# Patient Record
Sex: MALE | Race: WHITE | URBAN - METROPOLITAN AREA
[De-identification: names, ages, dates, MRNs, and addresses within clinical notes are randomized per-mention and may not be internally consistent; named-entity substitution may affect disease eponyms.]

---

## 2017-05-24 ENCOUNTER — HISTORICAL (OUTPATIENT)
Dept: ADMINISTRATIVE | Facility: HOSPITAL | Age: 57
End: 2017-05-24

## 2017-05-24 LAB
ABS NEUT (OLG): 9.9 X10(3)/MCL
ALBUMIN SERPL-MCNC: 3.6 GM/DL (ref 3.4–5)
ALBUMIN/GLOB SERPL: 1 {RATIO}
ALP SERPL-CCNC: 105 UNIT/L (ref 45–117)
ALT SERPL-CCNC: 26 UNIT/L (ref 16–61)
AST SERPL-CCNC: 15 UNIT/L (ref 15–37)
BASOPHILS # BLD AUTO: 0.1 X10(3)/MCL
BASOPHILS NFR BLD AUTO: 0.5 % (ref 0–0.9)
BILIRUB SERPL-MCNC: 0.3 MG/DL (ref 0.2–1)
BILIRUBIN DIRECT+TOT PNL SERPL-MCNC: <0.1 MG/DL (ref 0–0.2)
BILIRUBIN DIRECT+TOT PNL SERPL-MCNC: >0.2 MG/DL (ref 0–1)
BUN SERPL-MCNC: 24 MG/DL (ref 7–18)
CALCIUM SERPL-MCNC: 9.1 MG/DL (ref 8.5–10.1)
CHLORIDE SERPL-SCNC: 104 MMOL/L (ref 98–107)
CO2 SERPL-SCNC: 27 MMOL/L (ref 21–32)
CREAT SERPL-MCNC: 1.15 MG/DL (ref 0.7–1.3)
EOSINOPHIL # BLD AUTO: 0.1 X10(3)/MCL
EOSINOPHIL NFR BLD AUTO: 0.6 % (ref 0–6.5)
ERYTHROCYTE [DISTWIDTH] IN BLOOD BY AUTOMATED COUNT: 13.6 % (ref 11.5–14.8)
GLOBULIN SER-MCNC: 5 GM/DL (ref 2–4)
GLUCOSE SERPL-MCNC: 152 MG/DL (ref 74–106)
HCT VFR BLD AUTO: 46.8 % (ref 36.2–49.3)
HGB BLD-MCNC: 14.9 GM/DL (ref 12.6–17.3)
LYMPHOCYTES # BLD AUTO: 1.4 X10(3)/MCL
LYMPHOCYTES NFR BLD AUTO: 11.4 % (ref 16.2–38.3)
MCH RBC QN AUTO: 29.9 PG (ref 28.5–33.8)
MCHC RBC AUTO-ENTMCNC: 31.8 % (ref 32.6–37)
MCV RBC AUTO: 94 FL (ref 80–99)
MONOCYTES # BLD AUTO: 1.1 X10(3)/MCL
MONOCYTES NFR BLD AUTO: 8.7 % (ref 4.7–11.3)
NEUTROPHILS # BLD AUTO: 9.9 X10(3)/MCL
NEUTROPHILS NFR BLD AUTO: 78.2 % (ref 49.1–73.4)
PLATELET # BLD AUTO: 282 X10(3)/MCL (ref 136–369)
PMV BLD AUTO: 11.6 FL (ref 7.4–10.4)
POTASSIUM SERPL-SCNC: 4.9 MMOL/L (ref 3.5–5.1)
PROT SERPL-MCNC: 8.4 GM/DL (ref 6.4–8.2)
RBC # BLD AUTO: 4.98 X10(6)/MCL (ref 4.19–5.75)
SODIUM SERPL-SCNC: 139 MMOL/L (ref 136–145)
WBC # SPEC AUTO: 12.7 X10(3)/MCL (ref 4–10.5)

## 2017-05-28 LAB — FINAL CULTURE: NORMAL

## 2017-05-30 LAB — FINAL CULTURE: NORMAL

## 2017-06-28 ENCOUNTER — HISTORICAL (OUTPATIENT)
Dept: ADMINISTRATIVE | Facility: HOSPITAL | Age: 57
End: 2017-06-28

## 2017-07-01 LAB — FINAL CULTURE: NORMAL

## 2017-10-25 ENCOUNTER — HISTORICAL (OUTPATIENT)
Dept: ADMINISTRATIVE | Facility: HOSPITAL | Age: 57
End: 2017-10-25

## 2017-10-28 LAB — FINAL CULTURE: NORMAL

## 2017-10-31 ENCOUNTER — HISTORICAL (OUTPATIENT)
Dept: ADMINISTRATIVE | Facility: HOSPITAL | Age: 57
End: 2017-10-31

## 2018-04-18 ENCOUNTER — HISTORICAL (OUTPATIENT)
Dept: ADMINISTRATIVE | Facility: HOSPITAL | Age: 58
End: 2018-04-18

## 2018-07-25 ENCOUNTER — HISTORICAL (OUTPATIENT)
Dept: ADMINISTRATIVE | Facility: HOSPITAL | Age: 58
End: 2018-07-25

## 2018-08-08 ENCOUNTER — HISTORICAL (OUTPATIENT)
Dept: ADMINISTRATIVE | Facility: HOSPITAL | Age: 58
End: 2018-08-08

## 2018-11-21 ENCOUNTER — HISTORICAL (OUTPATIENT)
Dept: ADMINISTRATIVE | Facility: HOSPITAL | Age: 58
End: 2018-11-21

## 2019-01-21 ENCOUNTER — HISTORICAL (OUTPATIENT)
Dept: ADMINISTRATIVE | Facility: HOSPITAL | Age: 59
End: 2019-01-21

## 2019-01-29 ENCOUNTER — HISTORICAL (OUTPATIENT)
Dept: ADMINISTRATIVE | Facility: HOSPITAL | Age: 59
End: 2019-01-29

## 2019-02-27 ENCOUNTER — HISTORICAL (OUTPATIENT)
Dept: ADMINISTRATIVE | Facility: HOSPITAL | Age: 59
End: 2019-02-27

## 2019-04-03 ENCOUNTER — HISTORICAL (OUTPATIENT)
Dept: ADMINISTRATIVE | Facility: HOSPITAL | Age: 59
End: 2019-04-03

## 2019-04-03 LAB
ABS NEUT (OLG): 8.35 X10(3)/MCL (ref 2.1–9.2)
ALBUMIN SERPL-MCNC: 3.6 GM/DL (ref 3.4–5)
ALBUMIN/GLOB SERPL: 0.9 RATIO (ref 1.1–2)
ALP SERPL-CCNC: 75 UNIT/L (ref 50–136)
ALT SERPL-CCNC: 29 UNIT/L (ref 12–78)
AST SERPL-CCNC: 13 UNIT/L (ref 15–37)
BASOPHILS # BLD AUTO: 0.1 X10(3)/MCL (ref 0–0.2)
BASOPHILS NFR BLD AUTO: 1 %
BILIRUB SERPL-MCNC: 0.3 MG/DL (ref 0.2–1)
BILIRUBIN DIRECT+TOT PNL SERPL-MCNC: 0.1 MG/DL (ref 0–0.5)
BILIRUBIN DIRECT+TOT PNL SERPL-MCNC: 0.2 MG/DL (ref 0–0.8)
BUN SERPL-MCNC: 28 MG/DL (ref 7–18)
CALCIUM SERPL-MCNC: 9.2 MG/DL (ref 8.5–10.1)
CHLORIDE SERPL-SCNC: 105 MMOL/L (ref 98–107)
CO2 SERPL-SCNC: 32 MMOL/L (ref 21–32)
CREAT SERPL-MCNC: 1.23 MG/DL (ref 0.7–1.3)
CRP SERPL HS-MCNC: 27.5 MG/L (ref 0–3)
EOSINOPHIL # BLD AUTO: 0.2 X10(3)/MCL (ref 0–0.9)
EOSINOPHIL NFR BLD AUTO: 2 %
ERYTHROCYTE [DISTWIDTH] IN BLOOD BY AUTOMATED COUNT: 15.8 % (ref 11.5–17)
ERYTHROCYTE [SEDIMENTATION RATE] IN BLOOD: 37 MM/HR (ref 0–15)
EST. AVERAGE GLUCOSE BLD GHB EST-MCNC: 177 MG/DL
GLOBULIN SER-MCNC: 3.8 GM/DL (ref 2.4–3.5)
GLUCOSE SERPL-MCNC: 146 MG/DL (ref 74–106)
HBA1C MFR BLD: 7.8 % (ref 4.2–6.3)
HCT VFR BLD AUTO: 45.8 % (ref 42–52)
HGB BLD-MCNC: 13.1 GM/DL (ref 14–18)
LYMPHOCYTES # BLD AUTO: 2.6 X10(3)/MCL (ref 0.6–4.6)
LYMPHOCYTES NFR BLD AUTO: 20 %
MCH RBC QN AUTO: 26.8 PG (ref 27–31)
MCHC RBC AUTO-ENTMCNC: 28.6 GM/DL (ref 33–36)
MCV RBC AUTO: 93.9 FL (ref 80–94)
MONOCYTES # BLD AUTO: 1.1 X10(3)/MCL (ref 0.1–1.3)
MONOCYTES NFR BLD AUTO: 9 %
NEUTROPHILS # BLD AUTO: 8.35 X10(3)/MCL (ref 2.1–9.2)
NEUTROPHILS NFR BLD AUTO: 66 %
PLATELET # BLD AUTO: 312 X10(3)/MCL (ref 130–400)
PMV BLD AUTO: 11 FL (ref 9.4–12.4)
POTASSIUM SERPL-SCNC: 5.2 MMOL/L (ref 3.5–5.1)
PREALB SERPL-MCNC: 31 MG/DL (ref 18–38)
PROT SERPL-MCNC: 7.4 GM/DL (ref 6.4–8.2)
RBC # BLD AUTO: 4.88 X10(6)/MCL (ref 4.7–6.1)
SODIUM SERPL-SCNC: 139 MMOL/L (ref 136–145)
WBC # SPEC AUTO: 12.7 X10(3)/MCL (ref 4.5–11.5)

## 2019-05-17 ENCOUNTER — HISTORICAL (OUTPATIENT)
Dept: ADMINISTRATIVE | Facility: HOSPITAL | Age: 59
End: 2019-05-17

## 2019-05-20 LAB — FINAL CULTURE: NORMAL

## 2022-09-13 NOTE — HISTORICAL [facility]
This is a historical note converted from Ally. Formatting and pictures may have been removed.  Please reference Ally for original formatting and attached multimedia. Chief Complaint  RIGHT HIP PAIN AND RIGHT KNEE REFERRED FROM DR. CARRILLO.  History of Present Illness  58-year-old male presents today for evaluation of right hip pain. ?Patient has had right groin pain with significant stiffness for a number of years. ?He is tried anti-inflammatories of although is unable to take these due to?being on blood thinners. ?He has difficulty ambulating due to his hip and groin pain. ?Notes significant stiffness.  Review of Systems  Denies fevers, chills, chest pain, shortness of breath. Comprehensive review of systems performed and otherwise negative except as noted in HPI.  Physical Exam  Vitals & Measurements  BP:?142/70?  HT:?144.7?cm? WT:?50.07?kg? BMI:?23.91?  General: No acute distress, alert and oriented, healthy appearing?  HEENT: Head is atraumatic, mucous membranes are moist  Neck: Supples, no JVD  Cardiovascular: Palpable dorsalis pedis and posterior tibial pulses, regular rate and rhythm to those pulses  Lungs: Breathing non-labored  Skin: no rashes appreciated  Neurologic: Can flex and extend knees, ankles, and toes. Sensation is grossly intact  ?  Right hip without wound or skin breakdown.?  Mild?tenderness to palpation about the greater trochanter and gluteus  Flexion to 85. Internal Rotation to -10. External Rotation to 10. ?Significant groin pain with range of motion  Positive?Stinchfield  Negative Trendelenburg Sign  Positive antalgic gait  Negative?straight leg raise  Assessment/Plan  1.?Primary osteoarthritis of right hip?M16.11  ?Patient with end-stage arthritis of the right hip with complete loss of joint space. ?We discussed all treatment options. ?He is currently?at a BMI of roughly 47. ?We discussed weight loss options as well. ?He also has an open sore to his left foot?which we need to get healed  prior to surgical intervention.? He like to try an injection to try to calm his pain down. ?We will plan for injection of his left hip?on February 27.? We will see him back in 2 weeks for preop.  Ordered:  Clinic Follow up, *Est. 02/12/19 3:00:00 CST, Order for future visit[facility]  Clinic Follow-up Pre Op, 01/29/19 15:18:00 CST, Order for future visit, Primary osteoarthritis of right hip, Orthopaedics  Office/Outpatient Visit Level 3 Established [id number] PC, [facility], 01/29/19 15:17:00 CST  Office/Outpatient Visit Level 3 Established [id number] PC, [facility], 01/29/19 14:50:00 CST  ?  Right hip pain?M25.551  ?   Problem List/Past Medical History  Ongoing  Anxiety and depression  DM foot ulcer  DMII (diabetes mellitus, type 2)  HTN (hypertension)  Knee osteoarthritis  KAREN on CPAP  Peripheral edema  Peripheral neuropathy  Historical  No qualifying data  Procedure/Surgical History  right eye   Medications  allopurinol 300 mg oral tablet, See Instructions, 3 refills  amLODIPine 10 mg oral tablet, See Instructions, 3 refills  CLOPIDOGREL 75 MG TABLET, 75 mg= 1 tab(s), Oral, Daily  FARXIGA 10 MG TABLET, 10 mg= 1 tab(s), Oral, Daily  FUROSEMIDE 20 MG TABLET, 20 mg= 1 tab(s), Oral, Daily  jed oral capsule, See Instructions  GLIPIZIDE-METFORMIN 5-500 MG, 2 tab(s), Oral, BID  JANUVIA 50 MG TABLET, 50 mg= 1 tab(s), Oral, Daily  Percocet 5/325 oral tablet, 1 tab(s), Oral, q6hr, PRN  sertraline 50 mg oral tablet, 50 mg= 1 tab(s), Oral, Daily, 3 refills  TRESIBA FLEXTOUCH 200 UNITS/ML, See Instructions  Vitamin D 50,000 intl units oral capsule, 35682 IntUnit= 1 cap(s), Oral, qWeek, 3 refills  Allergies  No Known Medication Allergies  Social History  Alcohol  Past, 05/24/2017  Employment/School  Unemployed, 05/15/2018  Substance Abuse  Never, 05/24/2017  Tobacco  Former smoker, quit more  than 30 days ago, No, 01/29/2019  Former smoker, quit more than 30 days ago, No, 01/21/2019  Former smoker, quit more than 30 days ago, N/A, 01/09/2019  Former smoker, 05/24/2017  Family History  CAD - Coronary artery disease: Father.  CVA - Cerebrovascular accident: Father.  Diabetes mellitus type 2: Mother, Father and Brother.  Immunizations  Vaccine Date Status   tetanus/diphtheria/pertussis, acel(Tdap) 12/02/2015 Recorded   Health Maintenance  Health Maintenance  ???Pending?(in the next year)  ??? ??OverDue  ??? ? ? ?Coronary Artery Disease Maintenance-Antiplatelet Agent Prescribed due??and every?  ??? ??Due?  ??? ? ? ?ADL Screening due??01/29/19??and every 1??year(s)  ??? ? ? ?Alcohol Misuse Screening due??01/29/19??and every 1??year(s)  ??? ? ? ?Aspirin Therapy for CVD Prevention due??01/29/19??and every 1??year(s)  ??? ? ? ?Colorectal Screening due??01/29/19??and every?  ??? ? ? ?Coronary Artery Disease Maintenance-Lipid Lowering Therapy due??01/29/19??and every?  ??? ? ? ?Diabetes Maintenance-Urine Dipstick due??01/29/19??Variable frequency  ??? ? ? ?Diabetes Maintenance-Microalbumin due??01/29/19??Variable frequency  ??? ? ? ?Diabetes Maintenance-Eye Exam due??01/29/19??and every?  ??? ? ? ?Diabetes Maintenance-Foot Exam due??01/29/19??and every?  ??? ? ? ?Diabetes Maintenance-Fasting Lipid Profile due??01/29/19??and every?  ??? ? ? ?Hypertension Management-Education due??01/29/19??and every 1??year(s)  ??? ? ? ?Lipid Screening due??01/29/19??and every?  ??? ? ? ?Smoking Cessation due??01/29/19??Variable frequency  ??? ? ? ?Zoster Vaccine due??01/29/19??and every 100??year(s)  ??? ??Due In Future?  ??? ? ? ?Hypertension Management-BMP not due until??12/12/19??and every 1??year(s)  ??? ? ? ?Diabetes Maintenance-Serum Creatinine not due until??12/12/19??and every 1??year(s)  ??? ? ? ?Blood Pressure Screening not due until??01/21/20??and every 1??year(s)  ??? ? ? ?Body Mass Index Check not due  until??01/21/20??and every 1??year(s)  ??? ? ? ?Diabetes Maintenance-HgbA1c not due until??01/21/20??and every 1??year(s)  ??? ? ? ?Hypertension Management-Blood Pressure not due until??01/21/20??and every 1??year(s)  ???Satisfied?(in the past 1 year)  ??? ??Satisfied?  ??? ? ? ?Blood Pressure Screening on??01/29/19.??Satisfied by Maribel Smiley  ??? ? ? ?Body Mass Index Check on??01/29/19.??Satisfied by Maribel Smiley  ??? ? ? ?Coronary Artery Disease Maintenance-Antiplatelet Agent Prescribed on??01/09/19.  ??? ? ? ?Depression Screening on??01/21/19.??Satisfied by Rosangela Larson  ??? ? ? ?Diabetes Maintenance-Serum Creatinine on??12/12/18.??Satisfied by Amy Roche  ??? ? ? ?Diabetes Screening on??12/12/18.??Satisfied by Amy Roche  ??? ? ? ?Hypertension Management-Blood Pressure on??01/29/19.??Satisfied by Maribel Smiley  ??? ? ? ?Influenza Vaccine on??01/29/19.??Satisfied by Maribel Smiley  ??? ? ? ?Obesity Screening on??01/29/19.??Satisfied by Maribel Smiley  ?  ?  Diagnostic Results  AP lateral right hip taken reviewed. ?Patient with end-stage?arthritis with complete loss of joint space and bone-on-bone articulation.

## 2022-09-13 NOTE — HISTORICAL [facility]
This is a historical note converted from Ally. Formatting and pictures may have been removed.  Please reference Ally for original formatting and attached multimedia. Chief Complaint  Right knee pain ongoing for years. Pt states the pain is mainly in the knee no radiating pain. Pt states he had a knee injection on 1/16/19 wit his pcp Dr Bear. Denies any injury or fall  History of Present Illness  He is a pleasant 4-year-old whose had?right knee pain and right hip pain for the last few years.? The pain is localized globally around the knee and also around the hip.??He is using a cane for ambulation. ?He has had history of injections in the past without significant relief.? Last injection was in January 2019.? He is using pain medicine intermittently. ?He cannot take anti-inflammatory medicines because of his Plavix use.? He is got a history of diabetes and has a foot ulcer?on the contralateral side. ?He also has a?vascular procedure to reestablish a good blood flow.  Review of Systems  Comprehensive review of system?was performed with no exceptions other than noted in the history of present illness  Physical Exam  Vitals & Measurements  BP:?142/70?  HT:?170?cm? HT:?170?cm? HT:?170?cm? WT:?144.7?kg? WT:?144.7?kg? WT:?144.7?kg? BMI:?50.07?  Gen: WN, WD, NAD  Card/Res: NL breathing, +distal pulses  Abdomen: ND  Standing exam  stance:?Varus alignment, no significant leg-length discrepancy  gait:?Antalgic limp  ?   Knee examination  - General comments: Venous stasis changes  ?   - Tenderness: Global  ?   Knee??????????RIGHT??????????LEFT  Skin: ??????????Intact ??????????Intact  ROM:??????????0-110??????????0-130  Effusion:??????+????????????? Neg  MJL TTP:??????+????????????? Neg  LJL TTP:????????+????????????? Neg  Reginaldo:??? +????????????? Neg  Pat crep:???????+????????????????Neg  Patella TTPs: Neg ???????????????Neg  Patella grind: Neg??????????? ?Neg  Lachman: ?????Neg ????????????????????Neg  Pivot shift:  ?????Neg ???????????? Neg  Valgus stress: Neg ???????????????Neg  Varus stress: Neg ???????????????Neg  Posterior drawer: Neg ??????????Neg  ?   N-V ????????????????????intact??????????intact  Hip:???????????????????????Limited range of motion to 70 degrees of forward flexion and?5 degrees of internal and external rotation.  ?   Lower extremity edema:Negative  ?  Assessment/Plan  1.?OA (osteoarthritis) of hip?M16.9  I think?he would be best suited with a total hip arthroplasty. ?We will get him over to see 1 of my partners for?surgical consultation.  Ordered:  Office/Outpatient Visit Level 3 New [id number] PC, OA (osteoarthritis) of hip  OA (osteoarthritis) of knee, [facility], 01/21/19 15:42:00 CST  ?  2.?OA (osteoarthritis) of knee?M17.10  Ordered:  Office/Outpatient Visit Level 3 New [id number] PC, OA (osteoarthritis) of hip  OA (osteoarthritis) of knee, [facility], 01/21/19 15:42:00 CST  ?  Orders:  Clinic Follow-up PRN, 01/21/19 15:42:00 CST, Future Order, [facility]  XR Knee Right 4 or More Views, Routine, 01/21/19 15:04:00 CST, Pain, right knee pain, None, Ambulatory, Rad Type, Right knee pain, Schedule this test, Not Scheduled, 01/21/19 15:04:00 CST   Problem List/Past Medical History  Ongoing  Anxiety and depression  DM foot ulcer  DMII (diabetes mellitus, type 2)  HTN (hypertension)  Knee osteoarthritis  KAREN on CPAP  Peripheral edema  Peripheral neuropathy  Historical  No qualifying data  Procedure/Surgical History  right eye   Medications  allopurinol 300 mg oral tablet, See Instructions, 3 refills  amLODIPine 10 mg oral tablet, See Instructions, 3 refills  CLOPIDOGREL 75 MG TABLET, 75 mg= 1 tab(s), Oral, Daily  FARXIGA 10 MG TABLET, 10 mg= 1 tab(s), Oral, Daily  FUROSEMIDE 20 MG TABLET, 20 mg= 1 tab(s), Oral, Daily  jed oral capsule, See Instructions  GLIPIZIDE-METFORMIN 5-500 MG, 2 tab(s), Oral, BID  JANUVIA 50 MG TABLET, 50 mg= 1 tab(s), Oral, Daily  Percocet 5/325 oral tablet, 1 tab(s), Oral,  q6hr, PRN  sertraline 50 mg oral tablet, 50 mg= 1 tab(s), Oral, Daily, 3 refills  TRESIBA FLEXTOUCH 200 UNITS/ML, See Instructions  Vitamin D 50,000 intl units oral capsule, 27510 IntUnit= 1 cap(s), Oral, qWeek, 3 refills  Allergies  No Known Medication Allergies  Social History  Alcohol  Past, 05/24/2017  Employment/School  Unemployed, 05/15/2018  Substance Abuse  Never, 05/24/2017  Tobacco  Former smoker, quit more than 30 days ago, No, 01/21/2019  Former smoker, quit more than 30 days ago, N/A, 01/09/2019  Former smoker, 05/24/2017  Family History  CAD - Coronary artery disease: Father.  CVA - Cerebrovascular accident: Father.  Diabetes mellitus type 2: Mother, Father and Brother.  Immunizations  Vaccine Date Status   tetanus/diphtheria/pertussis, acel(Tdap) 12/02/2015 Recorded   Health Maintenance  Health Maintenance  ???Pending?(in the next year)  ??? ??OverDue  ??? ? ? ?Coronary Artery Disease Maintenance-Antiplatelet Agent Prescribed due??and every?  ??? ??Due?  ??? ? ? ?ADL Screening due??01/21/19??and every 1??year(s)  ??? ? ? ?Alcohol Misuse Screening due??01/21/19??and every 1??year(s)  ??? ? ? ?Aspirin Therapy for CVD Prevention due??01/21/19??and every 1??year(s)  ??? ? ? ?Colorectal Screening due??01/21/19??and every?  ??? ? ? ?Coronary Artery Disease Maintenance-Lipid Lowering Therapy due??01/21/19??and every?  ??? ? ? ?Diabetes Maintenance-Urine Dipstick due??01/21/19??Variable frequency  ??? ? ? ?Diabetes Maintenance-Microalbumin due??01/21/19??Variable frequency  ??? ? ? ?Diabetes Maintenance-Eye Exam due??01/21/19??and every?  ??? ? ? ?Diabetes Maintenance-Foot Exam due??01/21/19??and every?  ??? ? ? ?Diabetes Maintenance-Fasting Lipid Profile due??01/21/19??and every?  ??? ? ? ?Hypertension Management-Education due??01/21/19??and every 1??year(s)  ??? ? ? ?Lipid Screening due??01/21/19??and every?  ??? ? ? ?Smoking Cessation due??01/21/19??Variable frequency  ??? ? ? ?Zoster Vaccine  due??01/21/19??and every 100??year(s)  ??? ??Due In Future?  ??? ? ? ?Diabetes Maintenance-HgbA1c not due until??11/09/19??and every 1??year(s)  ??? ? ? ?Hypertension Management-BMP not due until??12/12/19??and every 1??year(s)  ??? ? ? ?Diabetes Maintenance-Serum Creatinine not due until??12/12/19??and every 1??year(s)  ??? ? ? ?Blood Pressure Screening not due until??01/09/20??and every 1??year(s)  ??? ? ? ?Body Mass Index Check not due until??01/09/20??and every 1??year(s)  ??? ? ? ?Hypertension Management-Blood Pressure not due until??01/09/20??and every 1??year(s)  ???Satisfied?(in the past 1 year)  ??? ??Satisfied?  ??? ? ? ?Blood Pressure Screening on??01/21/19.??Satisfied by Rosangela Larson  ??? ? ? ?Body Mass Index Check on??01/21/19.??Satisfied by Rosangela Larson  ??? ? ? ?Coronary Artery Disease Maintenance-Antiplatelet Agent Prescribed on??01/09/19.  ??? ? ? ?Depression Screening on??01/21/19.??Satisfied by Rosangela Larson  ??? ? ? ?Diabetes Maintenance-Serum Creatinine on??12/12/18.??Satisfied by Amy Roche  ??? ? ? ?Diabetes Screening on??12/12/18.??Satisfied by Amy Roche  ??? ? ? ?Hypertension Management-Blood Pressure on??01/21/19.??Satisfied by Rosangela Larson  ??? ? ? ?Influenza Vaccine on??01/09/19.??Satisfied by Jaylene Yates LPN  ??? ? ? ?Obesity Screening on??01/21/19.??Satisfied by Rosangela Larson  ?  ?  Diagnostic Results  Knee radiographs 1/21/2019:?4 views of bilateral knees show?advanced disease in the medial and?patellofemoral compartments

## 2022-09-13 NOTE — OP NOTE
Patient:   Braxton Elizabeth             MRN: [id number]            FIN: [id number]               Age:   58 years     Sex:  Male     :  1960   Associated Diagnoses:   Primary osteoarthritis of right hip   Author:   Rivera Frazier MD      Operative Note   Operative Information   Date/ Time:  10/15/2018 07:38:00.     Procedures Performed: Right hip injection using fluoroscopy.     Preoperative Diagnosis: Primary osteoarthritis of right hip ([id number] M16.11).     Postoperative Diagnosis: Primary osteoarthritis of right hip ([id number] M16.11).     Surgeon: Rivera Frazier MD.     Anesthesia: concious sedation.     Description of Procedure/Findings/    Complications: Patient was involved who complains of ongoing right hip pain.  We discussed all treatment options.  Patient has a history of arthritis to the right hip.  Patient has tried and failed all measures.  The risk, benefits, alternatives to injection of right hip under anesthesia were discussed in detail including but limited to infection, bleeding, scarring, need for revision surgery, and resolution of pain.  Despite these risks patient elected to proceed with surgical mention.    Patient seen in preoperative holding.  The risk benefits alternatives again discussed.  All questions answered no guarantees made.  Patient was marked and consented.  Was taken the operating room.  placed under conscious sedation.  Using fluoroscopic guidance, the insertion site was localized.  It was injected with 3-4 cc 1% lidocaine.  Afterwards an 18-gauge spinal needle was then inserted using fluoroscopic guidance to the superior anterior aspect of the right hip/acetabulum.  2-3 cc of Isovue contrast was then injected.  We had an excellent arthrogram.  Afterwards 2 cc 1% lidocaine and 12 mg of betamethasone was injected into the right hip joint.  Needle was removed.  Band-Aid was placed over the injection site.  Patient arose from anesthesia without issue.  Was  transferred to recovery room in stable condition. postop he will be weightbearing as tolerated.  Patient will be discharged home.  .     Esimated blood loss: loss  0.5  cc.

## 2022-09-13 NOTE — ED PROVIDER NOTES
Patient:   Braxton Elizabeth             MRN: [id number]            FIN: [id number]               Age:   56 years     Sex:  Male     :  1960   Associated Diagnoses:   Venous stasis ulcer of right lower extremity   Author:   Flavio Gonsales NP      Basic Information   Time seen: Date & time 2017 15:00:00.   History source: Patient.   Arrival mode: Private vehicle.   History limitation: None.   Additional information: Chief Complaint from Nursing Triage Note : Chief Complaint   2017 14:42 CDT      Chief Complaint           right foot non-healing wound - states he is on 2nd round of antibiotics and now noticed foul smelling drainage.  .      History of Present Illness   The patient presents with right lateral plantar stasis ulcer and presently on Bactrim and Bactroban and has appt with podiatrist in 9 days. (next Friday), Patient has no fever..  The onset was chronic.  The course/duration of symptoms is fluctuating in intensity.  Type of injury: none.  Location: Right plantar surface foot. The character of symptoms is pain and redness.  The degree at present is minimal.  There are exacerbating factors including weight bearing, walking and palpation.  The relieving factor is none.  Risk factors consist of diabetes mellitus.  Prior episodes: none.  Therapy today: none.  Associated symptoms: none.  Additional history: none.        Review of Systems   Constitutional symptoms:  Negative except as documented in HPI.   Skin symptoms:  Breakdown, right plantar stasis ulcer.    Eye symptoms:  Negative except as documented in HPI.   ENMT symptoms:  Negative except as documented in HPI.   Respiratory symptoms:  Negative except as documented in HPI.   Cardiovascular symptoms:  Negative except as documented in HPI.   Gastrointestinal symptoms:  Negative except as documented in HPI.   Genitourinary symptoms:  Negative except as documented in HPI.   Musculoskeletal symptoms:  Negative except as documented in HPI.    Neurologic symptoms:  Negative except as documented in HPI.   Psychiatric symptoms:  Negative except as documented in HPI.   Endocrine symptoms:  Negative except as documented in HPI.   Hematologic/Lymphatic symptoms:  Negative except as documented in HPI.   Allergy/immunologic symptoms:  Negative except as documented in HPI.             Additional review of systems information: All other systems reviewed and otherwise negative.      Health Status   Allergies:    Allergies (1) Active Reaction  No Known Medication Allergies None Documented  , no known allergies.   Medications:  (Selected)   Documented Medications  Documented  Bactrim  mg-160 mg oral tablet: 1 tab(s), Oral, BID, # 20 tab(s), 0 Refill(s)  mupirocin 2% topical cream: 1 fiona, TOP, TID, # 30 gm, 0 Refill(s), per nurse's notes.      Past Medical/ Family/ Social History   Medical history: Negative.   Surgical history: Negative.   Family history: Not significant.   Social history: Alcohol use: Denies, Tobacco use: Denies, Drug use: Denies.      Physical Examination               Vital Signs   Vital Signs   5/24/2017 14:42 CDT      Temperature Oral          37.3 DegC                             Peripheral Pulse Rate     85 bpm                             Respiratory Rate          16 br/min                             SpO2                      94 %                             Oxygen Therapy            Room air                             Systolic Blood Pressure   166 mmHg  HI                             Diastolic Blood Pressure  82 mmHg  .   Measurements   5/24/2017 14:42 CDT      Weight Dosing             140 kg                             Weight Measured and Calculated in Lbs     308.64 lb                             Weight Estimated          140 kg                             Height/Length Dosing      173 cm                             Height/Length Estimated   173 cm                             Body Mass Index Estimated 46.78 kg/m2  .   Basic Oxygen  Information   5/24/2017 14:42 CDT      SpO2                      94 %                             Oxygen Therapy            Room air  .   General:  Alert, no acute distress.    Skin:  Warm, dry, intact.    Head:  Normocephalic, atraumatic.    Neck:  Supple, trachea midline, no tenderness.    Eye:  Pupils are equal, round and reactive to light.   Cardiovascular:  Regular rate and rhythm, No murmur, Arterial pulses: Right, lower extremity, femoral, popliteal, posterior tibial, dorsalis pedis, 2+.    Respiratory:  Lungs are clear to auscultation, respirations are non-labored, breath sounds are equal.    Chest wall:  No tenderness.   Musculoskeletal:  Normal ROM, normal strength, Ankle/foot: Right, lateral, plantar, foot, aligned, tenderness, range of motion normal, quarter sized healing stage 1 stasis ulcer with minimal erythema, no drainage and no foul smell noted., no swelling, no abrasion, no erythema, no ecchymosis.    Gastrointestinal:  Soft, Nontender, Normal bowel sounds.    Genitourinary:  No tenderness.   Neurological:  Alert and oriented to person, place, time, and situation, normal speech observed.    Psychiatric:  Cooperative, appropriate mood & affect.       Medical Decision Making   Differential Diagnosis:  Stasis foot ulcer  .   Documents reviewed:  Emergency department nurses' notes, emergency department records.    Results review:  Lab results : Lab View   5/24/2017 15:17 CDT      Sodium Lvl                139 mmol/L                             Potassium Lvl             4.9 mmol/L                             Chloride                  104 mmol/L                             CO2                       27.0 mmol/L                             Calcium Lvl               9.1 mg/dL                             Glucose Lvl               152 mg/dL  HI                             BUN                       24.0 mg/dL  HI                             Creatinine                1.15 mg/dL                              eGFR-AA                   >60 mL/min/1.73 m2  NA                             eGFR-DYLON                  >60 mL/min/1.73 m2  NA                             Bili Total                0.3 mg/dL                             Bili Direct               <0.10 mg/dL                             Bili Indirect             >0.20 mg/dL                             AST                       15 unit/L                             ALT                       26 unit/L                             Alk Phos                  105 unit/L                             Total Protein             8.4 gm/dL  HI                             Albumin Lvl               3.6 gm/dL                             Globulin                  5 gm/dL  HI                             A/G Ratio                 1  NA                             WBC                       12.7 x10(3)/mcL  HI                             RBC                       4.98 x10(6)/mcL                             Hgb                       14.9 gm/dL                             Hct                       46.8 %                             Platelet                  282 x10(3)/mcL                             MCV                       94.0 fL                             MCH                       29.9 pg                             MCHC                      31.8 %  LOW                             RDW                       13.6 %                             MPV                       11.6 fL  HI                             Abs Neut                  9.9 x10(3)/mcL  NA                             Neutro Auto               78.2 %  HI                             Lymph Auto                11.4 %  LOW                             Mono Auto                 8.7 %                             Eos Auto                  0.6 %                             Abs Eos                   0.1 x10(3)/mcL  NA                             Basophil Auto             0.5 %                             Abs Neutro                9.9  x10(3)/mcL  NA                             Abs Lymph                 1.4 x10(3)/mcL  NA                             Abs Mono                  1.1 x10(3)/mcL  NA                             Abs Baso                  0.1 x10(3)/mcL  NA  .      Impression and Plan   Diagnosis   Venous stasis ulcer of right lower extremity ([id number] I83.019)   Plan   Condition: Unchanged.    Disposition: Medically cleared, Discharged: Time  5/24/2017 16:19:00, to home.    Prescriptions: [facility] prescriptions   Pharmacy:  [facility] 5 mg-325 mg oral tablet (Prescribe): 1 tab(s), Oral, q6hr, PRN PRN as needed for pain, # 12 tab(s), 0 Refill(s)  Bactrim  mg-160 mg oral tablet (Prescribe): 1 tab(s), Oral, BID, X 7 day(s), # 14 tab(s), 0 Refill(s).    Patient was given the following educational materials: ED LGSW - Return To Work /Excuse printable - Corrected Umu (LAWINCH), Stasis Ulcer.    Follow up with: Follow up with your Podiatrist next Friday as scheduled.; Report to ED if symptoms return / worsen.    Counseled: Patient, Regarding diagnosis, Regarding diagnostic results, Regarding treatment plan, Regarding prescription, Patient indicated understanding of instructions.